# Patient Record
Sex: MALE | Race: WHITE | URBAN - METROPOLITAN AREA
[De-identification: names, ages, dates, MRNs, and addresses within clinical notes are randomized per-mention and may not be internally consistent; named-entity substitution may affect disease eponyms.]

---

## 2018-04-30 ENCOUNTER — OFFICE VISIT (OUTPATIENT)
Dept: FAMILY MEDICINE CLINIC | Facility: CLINIC | Age: 23
End: 2018-04-30

## 2018-04-30 VITALS
HEART RATE: 85 BPM | OXYGEN SATURATION: 98 % | WEIGHT: 265 LBS | SYSTOLIC BLOOD PRESSURE: 120 MMHG | HEIGHT: 71.75 IN | DIASTOLIC BLOOD PRESSURE: 72 MMHG | BODY MASS INDEX: 36.29 KG/M2

## 2018-04-30 DIAGNOSIS — Z11.1 SCREENING FOR TUBERCULOSIS: ICD-10-CM

## 2018-04-30 DIAGNOSIS — Z02.1 PHYSICAL EXAM, PRE-EMPLOYMENT: Primary | ICD-10-CM

## 2018-04-30 PROCEDURE — 99385 PREV VISIT NEW AGE 18-39: CPT | Performed by: PHYSICIAN ASSISTANT

## 2018-04-30 PROCEDURE — 86580 TB INTRADERMAL TEST: CPT | Performed by: PHYSICIAN ASSISTANT

## 2018-04-30 NOTE — PATIENT INSTRUCTIONS
Your TB test was placed today. Please return to the clinic on 05/02/2018 between 10:45 am and 7:25 pm or on 05/03/2018 between 8:00am and 10:00am to have your test read and to get documentation of the results.     If you do not return to have your test r

## 2018-04-30 NOTE — PROGRESS NOTES
CHIEF COMPLAINT:     Patient presents with:  Employment Physical      HPI:   Siria Johnston is a 21year old male who presents for physical exam for employment. The patient has provided a form from employer to be completed.   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 Cranial nerves are grossly intact.      ASSESSMENT AND PLAN:   Zuleyka Husbands is a 21year old male who presents for a employmentphysical exam.     ASSESSMENT: [place]

## 2018-05-02 ENCOUNTER — OFFICE VISIT (OUTPATIENT)
Dept: FAMILY MEDICINE CLINIC | Facility: CLINIC | Age: 23
End: 2018-05-02

## 2018-05-02 DIAGNOSIS — Z11.1 ENCOUNTER FOR PPD SKIN TEST READING: Primary | ICD-10-CM

## 2018-05-02 NOTE — PROGRESS NOTES
Recent Results (from the past 24 hour(s))  -TB INTRADERMAL TEST   Collection Time: 05/02/18  5:51 PM   Result Value Ref Range   [place]: Randy PALMA    Date Given: 4/30/18    Date Resulted: 5/2/18    Date Read: 5/2/18    Site[facility] right forearm    [facility]

## 2018-05-02 NOTE — PATIENT INSTRUCTIONS
05/02/18    Shantanu Claros  2/15/1995    This document is to verify Shantanu Claros had a TB skin test preformed and the results were: negative.     Date given: 04/30/18  Date read: 05/02/18        Please call the number listed above if you have further ques